# Patient Record
(demographics unavailable — no encounter records)

---

## 2025-06-18 NOTE — PHYSICAL EXAM
[de-identified] : On exam he has tenderness over the lateral epicondyle bilaterally with pain with resisted middle finger extension nontender distal biceps or radial tunnel bilaterally.  Nontender medial epicondyle.  Negative Tinel's cubital tunnel ulnar nerve stable in flexion.  No intrinsic or extrinsic atrophy full strength of abductor pollicis brevis.  No synovitis or tenosynovitis seen.  He does have a positive carpal tunnel compression test positive Tinel's on the left and a positive Phalen's test bilaterally. 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

## 2025-06-18 NOTE — ASSESSMENT
[FreeTextEntry1] : Clinically the patient has bilateral lateral epicondylitis and likely has carpal tunnel syndrome.  His description of it is slightly different than typical findings but he does have nighttime pain and positive provocative maneuvers.  I recommend common extensor stretching as well as a home exercise program in addition to diagnostic carpal tunnel injections  We discussed treatment options and shared decision-making was undertaken. I recommended that the patient undergo a diagnostic and therapeutic carpal tunnel injection. The risks benefits and alternatives were discussed with the patient including, but not limited to, subcutaneous atrophy, skin depigmentation, nerve injury, infection, worsening symptoms, pain etc. The patient agreed and understood informed consent and sterile conditions the right carpal tunnel was injected with 1 cc of Kenalog 10. A sterile Band-Aid was placed.  We discussed treatment options and shared decision-making was undertaken. I recommended that the patient undergo a diagnostic and therapeutic carpal tunnel injection. The risks benefits and alternatives were discussed with the patient including, but not limited to, subcutaneous atrophy, skin depigmentation, nerve injury, infection, worsening symptoms, pain etc. The patient agreed and understood informed consent and sterile conditions the left carpal tunnel was injected with 1 cc of Kenalog 10. A sterile Band-Aid was placed.  
2

## 2025-06-18 NOTE — HISTORY OF PRESENT ILLNESS
[Right] : right hand dominant [FreeTextEntry1] : Patient with a medical history of lupus here for initial evaluation of right greater than left bilateral forearm pain which started January 2025. He denies any trauma. He was seen by his rheumatologist who ordered an MRI for the right forearm. Patient also states he got x-rays done at [facility] in April which were negative.  MRI of the right forearm demonstrated suspicion of tendinitis of the distal biceps tendon with associated fluid and all hemorrhage extending to its insertion into the radius.  He is also here with bilateral hand pain. He reports of numbness and soreness which worsens at night.